# Patient Record
Sex: FEMALE | Race: BLACK OR AFRICAN AMERICAN | NOT HISPANIC OR LATINO
[De-identification: names, ages, dates, MRNs, and addresses within clinical notes are randomized per-mention and may not be internally consistent; named-entity substitution may affect disease eponyms.]

---

## 2017-04-13 ENCOUNTER — TRANSCRIPTION ENCOUNTER (OUTPATIENT)
Age: 42
End: 2017-04-13

## 2021-05-16 ENCOUNTER — EMERGENCY (EMERGENCY)
Facility: HOSPITAL | Age: 46
LOS: 1 days | Discharge: HOME | End: 2021-05-16
Attending: EMERGENCY MEDICINE
Payer: COMMERCIAL

## 2021-05-16 VITALS
WEIGHT: 145.51 LBS | HEIGHT: 60 IN | DIASTOLIC BLOOD PRESSURE: 72 MMHG | RESPIRATION RATE: 18 BRPM | TEMPERATURE: 99 F | HEART RATE: 93 BPM | OXYGEN SATURATION: 99 % | SYSTOLIC BLOOD PRESSURE: 118 MMHG

## 2021-05-16 VITALS
HEART RATE: 122 BPM | SYSTOLIC BLOOD PRESSURE: 116 MMHG | RESPIRATION RATE: 18 BRPM | TEMPERATURE: 98 F | DIASTOLIC BLOOD PRESSURE: 63 MMHG | OXYGEN SATURATION: 100 %

## 2021-05-16 LAB
ALBUMIN SERPL ELPH-MCNC: 3.4 G/DL — LOW (ref 3.5–5)
ALP SERPL-CCNC: 70 U/L — SIGNIFICANT CHANGE UP (ref 40–120)
ALT FLD-CCNC: 13 U/L DA — SIGNIFICANT CHANGE UP (ref 10–60)
ANION GAP SERPL CALC-SCNC: 9 MMOL/L — SIGNIFICANT CHANGE UP (ref 5–17)
AST SERPL-CCNC: 14 U/L — SIGNIFICANT CHANGE UP (ref 10–40)
BASOPHILS # BLD AUTO: 0.03 K/UL — SIGNIFICANT CHANGE UP (ref 0–0.2)
BASOPHILS NFR BLD AUTO: 0.4 % — SIGNIFICANT CHANGE UP (ref 0–2)
BILIRUB SERPL-MCNC: 0.2 MG/DL — SIGNIFICANT CHANGE UP (ref 0.2–1.2)
BUN SERPL-MCNC: 10 MG/DL — SIGNIFICANT CHANGE UP (ref 7–18)
CALCIUM SERPL-MCNC: 8.7 MG/DL — SIGNIFICANT CHANGE UP (ref 8.4–10.5)
CHLORIDE SERPL-SCNC: 105 MMOL/L — SIGNIFICANT CHANGE UP (ref 96–108)
CO2 SERPL-SCNC: 24 MMOL/L — SIGNIFICANT CHANGE UP (ref 22–31)
CREAT SERPL-MCNC: 0.77 MG/DL — SIGNIFICANT CHANGE UP (ref 0.5–1.3)
EOSINOPHIL # BLD AUTO: 0.58 K/UL — HIGH (ref 0–0.5)
EOSINOPHIL NFR BLD AUTO: 8.3 % — HIGH (ref 0–6)
GLUCOSE SERPL-MCNC: 78 MG/DL — SIGNIFICANT CHANGE UP (ref 70–99)
HCG SERPL-ACNC: <1 MIU/ML — SIGNIFICANT CHANGE UP
HCT VFR BLD CALC: 37.4 % — SIGNIFICANT CHANGE UP (ref 34.5–45)
HGB BLD-MCNC: 12.2 G/DL — SIGNIFICANT CHANGE UP (ref 11.5–15.5)
IMM GRANULOCYTES NFR BLD AUTO: 0.3 % — SIGNIFICANT CHANGE UP (ref 0–1.5)
LYMPHOCYTES # BLD AUTO: 2.64 K/UL — SIGNIFICANT CHANGE UP (ref 1–3.3)
LYMPHOCYTES # BLD AUTO: 37.7 % — SIGNIFICANT CHANGE UP (ref 13–44)
MCHC RBC-ENTMCNC: 28 PG — SIGNIFICANT CHANGE UP (ref 27–34)
MCHC RBC-ENTMCNC: 32.6 GM/DL — SIGNIFICANT CHANGE UP (ref 32–36)
MCV RBC AUTO: 86 FL — SIGNIFICANT CHANGE UP (ref 80–100)
MONOCYTES # BLD AUTO: 0.61 K/UL — SIGNIFICANT CHANGE UP (ref 0–0.9)
MONOCYTES NFR BLD AUTO: 8.7 % — SIGNIFICANT CHANGE UP (ref 2–14)
NEUTROPHILS # BLD AUTO: 3.13 K/UL — SIGNIFICANT CHANGE UP (ref 1.8–7.4)
NEUTROPHILS NFR BLD AUTO: 44.6 % — SIGNIFICANT CHANGE UP (ref 43–77)
NRBC # BLD: 0 /100 WBCS — SIGNIFICANT CHANGE UP (ref 0–0)
PLATELET # BLD AUTO: 253 K/UL — SIGNIFICANT CHANGE UP (ref 150–400)
POTASSIUM SERPL-MCNC: 3.9 MMOL/L — SIGNIFICANT CHANGE UP (ref 3.5–5.3)
POTASSIUM SERPL-SCNC: 3.9 MMOL/L — SIGNIFICANT CHANGE UP (ref 3.5–5.3)
PROT SERPL-MCNC: 7.2 G/DL — SIGNIFICANT CHANGE UP (ref 6–8.3)
RAPID RVP RESULT: DETECTED
RBC # BLD: 4.35 M/UL — SIGNIFICANT CHANGE UP (ref 3.8–5.2)
RBC # FLD: 12 % — SIGNIFICANT CHANGE UP (ref 10.3–14.5)
RV+EV RNA SPEC QL NAA+PROBE: DETECTED
SARS-COV-2 RNA SPEC QL NAA+PROBE: SIGNIFICANT CHANGE UP
SODIUM SERPL-SCNC: 138 MMOL/L — SIGNIFICANT CHANGE UP (ref 135–145)
WBC # BLD: 7.01 K/UL — SIGNIFICANT CHANGE UP (ref 3.8–10.5)
WBC # FLD AUTO: 7.01 K/UL — SIGNIFICANT CHANGE UP (ref 3.8–10.5)

## 2021-05-16 PROCEDURE — 96374 THER/PROPH/DIAG INJ IV PUSH: CPT

## 2021-05-16 PROCEDURE — 85025 COMPLETE CBC W/AUTO DIFF WBC: CPT

## 2021-05-16 PROCEDURE — 71046 X-RAY EXAM CHEST 2 VIEWS: CPT | Mod: 26

## 2021-05-16 PROCEDURE — 94640 AIRWAY INHALATION TREATMENT: CPT

## 2021-05-16 PROCEDURE — 84702 CHORIONIC GONADOTROPIN TEST: CPT

## 2021-05-16 PROCEDURE — 99284 EMERGENCY DEPT VISIT MOD MDM: CPT

## 2021-05-16 PROCEDURE — 36415 COLL VENOUS BLD VENIPUNCTURE: CPT

## 2021-05-16 PROCEDURE — 80053 COMPREHEN METABOLIC PANEL: CPT

## 2021-05-16 PROCEDURE — 0225U NFCT DS DNA&RNA 21 SARSCOV2: CPT

## 2021-05-16 PROCEDURE — 71046 X-RAY EXAM CHEST 2 VIEWS: CPT

## 2021-05-16 PROCEDURE — 99285 EMERGENCY DEPT VISIT HI MDM: CPT | Mod: 25

## 2021-05-16 RX ORDER — IPRATROPIUM/ALBUTEROL SULFATE 18-103MCG
3 AEROSOL WITH ADAPTER (GRAM) INHALATION ONCE
Refills: 0 | Status: COMPLETED | OUTPATIENT
Start: 2021-05-16 | End: 2021-05-16

## 2021-05-16 RX ORDER — ALBUTEROL 90 UG/1
2.5 AEROSOL, METERED ORAL ONCE
Refills: 0 | Status: COMPLETED | OUTPATIENT
Start: 2021-05-16 | End: 2021-05-16

## 2021-05-16 RX ORDER — SODIUM CHLORIDE 9 MG/ML
1000 INJECTION INTRAMUSCULAR; INTRAVENOUS; SUBCUTANEOUS ONCE
Refills: 0 | Status: COMPLETED | OUTPATIENT
Start: 2021-05-16 | End: 2021-05-16

## 2021-05-16 RX ADMIN — ALBUTEROL 2.5 MILLIGRAM(S): 90 AEROSOL, METERED ORAL at 21:27

## 2021-05-16 RX ADMIN — Medication 3 MILLILITER(S): at 20:02

## 2021-05-16 RX ADMIN — ALBUTEROL 2.5 MILLIGRAM(S): 90 AEROSOL, METERED ORAL at 23:57

## 2021-05-16 RX ADMIN — SODIUM CHLORIDE 1000 MILLILITER(S): 9 INJECTION INTRAMUSCULAR; INTRAVENOUS; SUBCUTANEOUS at 20:03

## 2021-05-16 RX ADMIN — ALBUTEROL 2.5 MILLIGRAM(S): 90 AEROSOL, METERED ORAL at 23:01

## 2021-05-16 RX ADMIN — ALBUTEROL 2.5 MILLIGRAM(S): 90 AEROSOL, METERED ORAL at 22:54

## 2021-05-16 RX ADMIN — Medication 125 MILLIGRAM(S): at 20:03

## 2021-05-16 RX ADMIN — Medication 3 MILLILITER(S): at 20:03

## 2021-05-16 NOTE — ED PROVIDER NOTE - OBJECTIVE STATEMENT
47 y/o female with PMHx of asthma presents to the ED c/o shortness of breath x few days. Pt notes worsening shortness of breath, has been taking albuterol with some relief, Q 6 hours. Pt has never been intubated before. Pt denies chest pain, LOC, or any other complaints. Pt allergic to magnesium sulfate (hives).

## 2021-05-16 NOTE — ED PROVIDER NOTE - PROGRESS NOTE DETAILS
josseline re-assessments. wheeze progressively impoved and now mild base crackles scattered bl. feeling better and wants to go home, reviewed case and results w pt, questions answered and pt understands, advised return precautions and care plan.

## 2021-05-16 NOTE — ED PROVIDER NOTE - PATIENT PORTAL LINK FT
You can access the FollowMyHealth Patient Portal offered by [facility] by registering at the following website: [email or web address]. By joining [employer]’s FollowMyHealth portal, you will also be able to view your health information using other applications (apps) compatible with our system.

## 2021-05-16 NOTE — ED PROVIDER NOTE - CARE PLAN
Principal Discharge DX:	Moderate asthma with exacerbation, unspecified whether persistent  Secondary Diagnosis:	Rhinovirus

## 2021-05-16 NOTE — ED ADULT TRIAGE NOTE - BANDS:
Called and requested refill(s): patient  Medications:meclizine  Amount:  30 day  Pharmacy to be sent to:  [facility] [address]  Call back number:  [phone]  Okay to leave detailed voice message:  No            
Tal arthur MD  
Allergy;

## 2021-05-16 NOTE — ED ADULT NURSE NOTE - OBJECTIVE STATEMENT
Pt came in c/o of asthma exacerbation x 4 days unrelieved with home nebs with chest tightness and cough

## 2021-05-16 NOTE — ED ADULT TRIAGE NOTE - BMI (KG/M2)
I have personally evaluated and examined the patient. The Attending was available to me as a supervising provider if needed.
28.4

## 2021-05-16 NOTE — ED PROVIDER NOTE - NSFOLLOWUPINSTRUCTIONS_ED_ALL_ED_FT
IMPORTANT INSTRUCTIONS FROM Dr. MOONEY:    Please follow up with your personal medical doctor in 24-48 hours.   Bring results from today to your visit.    Prednisone as prescribed. Take your albuterol as we discussed.    If you were advised to take any medications - be sure to review the package insert.    If your symptoms change, get worse or if you have any new symptoms, come to the ER right away.  If you have any questions, call the ER at the phone number on this page.

## 2021-05-16 NOTE — ED PROVIDER NOTE - NSTIMEPROVIDERCAREINITIATE_GEN_ER
1. Caller Name: Terri Krishnan'                                         Call Back Number: [phone] (home) `      Patient approves a detailed voicemail message: N\A    Patient called and stated her anti-anxiety medication Citalopram is too strong.  She wants something else.  Please Advise.     16-May-2021 19:43

## 2021-05-17 NOTE — ED POST DISCHARGE NOTE - OTHER COMMUNICATION
Patient called requesting cxr results. Results given. Also requested to have rx sent to different pharmacy. Rx sent.

## 2024-03-15 NOTE — ED ADULT NURSE NOTE - NS ED PATIENT SAFETY CONCERN
Problem: Discharge Planning  Goal: Discharge to home or other facility with appropriate resources  3/14/2024 2043 by Yoly Shaffer, RN  Outcome: Progressing  3/14/2024 1900 by Viky Crowell, RN  Outcome: Progressing      No

## 2024-05-31 PROBLEM — J45.909 UNSPECIFIED ASTHMA, UNCOMPLICATED: Chronic | Status: ACTIVE | Noted: 2021-05-17

## 2024-06-05 PROBLEM — Z00.00 ENCOUNTER FOR PREVENTIVE HEALTH EXAMINATION: Status: ACTIVE | Noted: 2024-06-05

## 2024-06-10 ENCOUNTER — APPOINTMENT (OUTPATIENT)
Dept: ORTHOPEDIC SURGERY | Facility: CLINIC | Age: 49
End: 2024-06-10
Payer: COMMERCIAL

## 2024-06-10 VITALS — HEIGHT: 61 IN | BODY MASS INDEX: 26.43 KG/M2 | WEIGHT: 140 LBS

## 2024-06-10 DIAGNOSIS — M65.4 RADIAL STYLOID TENOSYNOVITIS [DE QUERVAIN]: ICD-10-CM

## 2024-06-10 PROCEDURE — 99203 OFFICE O/P NEW LOW 30 MIN: CPT | Mod: 25

## 2024-06-10 PROCEDURE — 20551 NJX 1 TENDON ORIGIN/INSJ: CPT | Mod: RT

## 2024-06-10 PROCEDURE — 73130 X-RAY EXAM OF HAND: CPT | Mod: RT

## 2024-06-10 PROCEDURE — 73110 X-RAY EXAM OF WRIST: CPT | Mod: RT

## 2024-06-10 NOTE — IMAGING
[de-identified] : Right hand/wrist No ecchymosis, swelling or wounds Normal muscle tone/ bulk TTP along the first dorsal compartment Full symmetric wrist ROM Able to make a painless composite fist +AIN/ PIN/ Ulnar n SILT throughout fingers wwp + Finkelstein's  3 views right hand and wrist: No acute fractures or malalignment

## 2024-06-10 NOTE — HISTORY OF PRESENT ILLNESS
[de-identified] :  06/10/2024 CASH CORTES is a 49 year old female here today for: Location: right wrist Complaint: radial sided wrist pain, worse with activities.  No injuries.  No numbness or tingling Symptom onset: 3 weeks ago  Prior treatments: brace Hand Dominance: right Occupation: office job (part-time ) PMH:  asthma, Emilia Danlos syndrome  Allergies: magnesium sulfate (through IV)

## 2024-06-10 NOTE — PROCEDURE
[FreeTextEntry1] : Corticosteroid injection [FreeTextEntry2] : Right de Quervain's tenosynovitis [FreeTextEntry3] : The risks and benefits of steroid injections were discussed. Verbal consent was obtained,  The site was prepped in a sterile fashion with alcohol A combination of 6mg (1cc) of Celestone and 2cc 1% xylocaine were injected under sterile conditions at the level of the right first dorsal compartment Patient tolerated the procedure without complication and was counseled on post injection expectations.

## 2024-06-10 NOTE — DISCUSSION/SUMMARY
[de-identified] : - discussed the etiology/ pathophysiology of the patient's complaints today in layman's terms - reviewed treatment options, conservative and operative - discussed conservative treatment options including the role for anti-inflammatory medications, injections, bracing and therapy - reviewed r/b/a to these - activity modifications - patient requests CSI, tolerated without complication - NSAIDs prn pain - f/u in 2 weeks

## 2024-06-24 ENCOUNTER — APPOINTMENT (OUTPATIENT)
Dept: ORTHOPEDIC SURGERY | Facility: CLINIC | Age: 49
End: 2024-06-24

## 2024-08-16 ENCOUNTER — APPOINTMENT (OUTPATIENT)
Dept: ORTHOPEDIC SURGERY | Facility: CLINIC | Age: 49
End: 2024-08-16

## 2024-08-26 ENCOUNTER — APPOINTMENT (OUTPATIENT)
Dept: ORTHOPEDIC SURGERY | Facility: CLINIC | Age: 49
End: 2024-08-26

## 2024-08-26 DIAGNOSIS — M65.4 RADIAL STYLOID TENOSYNOVITIS [DE QUERVAIN]: ICD-10-CM

## 2024-08-26 PROCEDURE — 20551 NJX 1 TENDON ORIGIN/INSJ: CPT | Mod: RT

## 2024-08-26 NOTE — HISTORY OF PRESENT ILLNESS
[de-identified] : 8/26/24: The patient returns today for repeat evaluation of her right de Quervain's tenosynovitis.  She received a CSI at her last visit more than 2 months ago with complete resolution of her pain until roughly 2 weeks ago.  Today she notes recurrent pain along the radial wrist associated with activities.  No injuries, numbness or tingling.  She inquires today about additional treatment options including operative and nonoperative.  06/10/2024 CASH CORTES is a 49 year old female here today for: Location: right wrist Complaint: radial sided wrist pain, worse with activities.  No injuries.  No numbness or tingling Symptom onset: 3 weeks ago  Prior treatments: brace Hand Dominance: right Occupation: office job (part-time ) PMH:  asthma, Emilia Danlos syndrome  Allergies: magnesium sulfate (through IV)

## 2024-08-26 NOTE — IMAGING
[de-identified] : Right hand/wrist No ecchymosis, swelling or wounds Normal muscle tone/ bulk TTP along the first dorsal compartment Full symmetric wrist ROM Able to make a painless composite fist +AIN/ PIN/ Ulnar n SILT throughout fingers wwp + Finkelstein's  3 views right hand and wrist: No acute fractures or malalignment

## 2024-08-26 NOTE — DISCUSSION/SUMMARY
[de-identified] : - again reviewed the etiology/ pathophysiology of the patient's complaints today in layman's terms - reviewed treatment options, conservative and operative - discussed conservative treatment options including the role for anti-inflammatory medications, injections, bracing and therapy - reviewed the operative procedure including first dorsal compartment release and the postoperative expectations in depth - reviewed r/b/a to these - activity modifications - patient requests repeat CSI, tolerated without complication - NSAIDs prn pain - f/u as needed

## 2024-08-26 NOTE — IMAGING
[de-identified] : Right hand/wrist No ecchymosis, swelling or wounds Normal muscle tone/ bulk TTP along the first dorsal compartment Full symmetric wrist ROM Able to make a painless composite fist +AIN/ PIN/ Ulnar n SILT throughout fingers wwp + Finkelstein's  3 views right hand and wrist: No acute fractures or malalignment

## 2024-08-26 NOTE — DISCUSSION/SUMMARY
[de-identified] : - again reviewed the etiology/ pathophysiology of the patient's complaints today in layman's terms - reviewed treatment options, conservative and operative - discussed conservative treatment options including the role for anti-inflammatory medications, injections, bracing and therapy - reviewed the operative procedure including first dorsal compartment release and the postoperative expectations in depth - reviewed r/b/a to these - activity modifications - patient requests repeat CSI, tolerated without complication - NSAIDs prn pain - f/u as needed

## 2024-08-26 NOTE — HISTORY OF PRESENT ILLNESS
[de-identified] : 8/26/24: The patient returns today for repeat evaluation of her right de Quervain's tenosynovitis.  She received a CSI at her last visit more than 2 months ago with complete resolution of her pain until roughly 2 weeks ago.  Today she notes recurrent pain along the radial wrist associated with activities.  No injuries, numbness or tingling.  She inquires today about additional treatment options including operative and nonoperative.  06/10/2024 CASH CORTES is a 49 year old female here today for: Location: right wrist Complaint: radial sided wrist pain, worse with activities.  No injuries.  No numbness or tingling Symptom onset: 3 weeks ago  Prior treatments: brace Hand Dominance: right Occupation: office job (part-time ) PMH:  asthma, Emilia Danlos syndrome  Allergies: magnesium sulfate (through IV)

## 2024-12-23 ENCOUNTER — APPOINTMENT (OUTPATIENT)
Dept: ORTHOPEDIC SURGERY | Facility: CLINIC | Age: 49
End: 2024-12-23
Payer: COMMERCIAL

## 2024-12-23 DIAGNOSIS — M65.4 RADIAL STYLOID TENOSYNOVITIS [DE QUERVAIN]: ICD-10-CM

## 2024-12-23 PROCEDURE — 99214 OFFICE O/P EST MOD 30 MIN: CPT
